# Patient Record
Sex: FEMALE | Race: BLACK OR AFRICAN AMERICAN | NOT HISPANIC OR LATINO | Employment: OTHER | URBAN - METROPOLITAN AREA
[De-identification: names, ages, dates, MRNs, and addresses within clinical notes are randomized per-mention and may not be internally consistent; named-entity substitution may affect disease eponyms.]

---

## 2017-11-22 PROBLEM — M65.341 TRIGGER RING FINGER OF RIGHT HAND: Status: ACTIVE | Noted: 2017-11-22

## 2017-11-22 PROBLEM — M65.342 TRIGGER RING FINGER OF LEFT HAND: Status: ACTIVE | Noted: 2017-11-22

## 2017-11-22 NOTE — H&P
[facility]  Orthopedics  H&P    Patient Name: Karin Mcdaniel  MRN: [id number]  Admission Date: (Not on file)  Primary Care Provider: Efren Andujar Jr, MD    Subjective:     Principal Problem:Trigger ring finger of left hand    Chief Complaint: No chief complaint on file.     HPI: Karin Mcdaniel is a 57 y.o. y/o female with a history of left ring finger trigger finger. She understands the risks and benefits of surgery and wishes to proceed with left ring finger trigger release at this time.      Past Medical History:   Diagnosis Date    Heart attack     Hypertension     Stroke        Past Surgical History:   Procedure Laterality Date    HYSTERECTOMY         Review of patient's allergies indicates:   Allergen Reactions    Pcn [penicillins]        No current facility-administered medications for this encounter.      Current Outpatient Prescriptions   Medication Sig    AMLODIPINE BESYLATE, BULK, MISC Take 10 mg by mouth once daily.    carvedilol (COREG) 12.5 MG tablet Take 25 mg by mouth 2 (two) times daily with meals.     clopidogrel (PLAVIX) 75 mg tablet Take 75 mg by mouth once daily.    furosemide (LASIX) 20 MG tablet Take 20 mg by mouth 2 (two) times daily.    lisinopril (PRINIVIL,ZESTRIL) 2.5 MG tablet Take 20 mg by mouth once daily.     loratadine (CLARITIN) 10 mg tablet Take 10 mg by mouth once daily.    potassium chloride (MICRO-K) 10 MEQ CpSR Take 20 mEq by mouth once daily.    quetiapine (SEROQUEL) 200 MG Tab Take 400 mg by mouth nightly.     simvastatin (ZOCOR) 40 MG tablet Take 40 mg by mouth every evening.     Family History     None        Social History Main Topics    Smoking status: Current Every Day Smoker     Packs/day: 1.00    Smokeless tobacco: Not on file    Alcohol use No      Comment: Ocassionally    Drug use: No    Sexual activity: No     Review of Systems   Constitution: Negative for fever and night sweats.   HENT: Negative for hearing loss and sore  throat.    Eyes: Negative for blurred vision, discharge and double vision.   Cardiovascular: Negative for chest pain, dyspnea on exertion and syncope.   Respiratory: Negative for cough and shortness of breath.    Skin: Negative for rash and skin cancer.   Musculoskeletal: Positive for joint pain. Negative for back pain and neck pain.   Gastrointestinal: Negative for abdominal pain, nausea and vomiting.   Genitourinary: Negative for bladder incontinence and hematuria.   Neurological: Negative for loss of balance and sensory change.   Psychiatric/Behavioral: Negative for depression. The patient is not nervous/anxious.      Objective:     Vital Signs (Most Recent):    Vital Signs (24h Range):  BP: ()/()   Arterial Line BP: ()/()            There is no height or weight on file to calculate BMI.    No intake or output data in the 24 hours ending 11/22/17 0903    General    Constitutional: She is oriented to person, place, and time. She appears well-developed and well-nourished. No distress.   HENT:   Head: Normocephalic and atraumatic.   Eyes: Conjunctivae and EOM are normal. Pupils are equal, round, and reactive to light.   Neck: Normal range of motion. Neck supple.   Cardiovascular: Normal rate, regular rhythm and normal heart sounds.  Exam reveals no gallop and no friction rub.    No murmur heard.  Pulmonary/Chest: Effort normal and breath sounds normal. No respiratory distress.   Abdominal: Soft. Bowel sounds are normal. She exhibits no distension. There is no tenderness.   Neurological: She is alert and oriented to person, place, and time. She has normal reflexes.   Psychiatric: She has a normal mood and affect. Her behavior is normal. Judgment and thought content normal.               Assessment/Plan:     Active Diagnoses:    Diagnosis Date Noted POA    PRINCIPAL PROBLEM:  Trigger ring finger of left hand [M65.342] 11/22/2017 Yes      Problems Resolved During this Admission:    Diagnosis Date Noted Date Resolved  POA     Plan/Surgical Procedure: Left ring finger trigger release  Surgery Date / Location: 12/1/17 -Dr. Jones at [facility]  Pre-op clearance per Anesthesia  Pt will d/c NSAIDs  7 days prior to procedure.  Informed written consent has not been signed, patient wishes to proceed at this time.      Osvaldo Kaba PA-C  Orthopedics  [facility]

## 2017-11-30 ENCOUNTER — HOSPITAL ENCOUNTER (OUTPATIENT)
Dept: PREADMISSION TESTING | Facility: OTHER | Age: 57
Discharge: HOME | End: 2017-11-30
Attending: ORTHOPAEDIC SURGERY
Payer: MEDICARE

## 2017-11-30 ENCOUNTER — ANESTHESIA EVENT (OUTPATIENT)
Dept: SURGERY | Facility: OTHER | Age: 57
End: 2017-11-30
Payer: MEDICARE

## 2017-11-30 VITALS
TEMPERATURE: 98 F | BODY MASS INDEX: 44.76 KG/M2 | OXYGEN SATURATION: 97 % | HEIGHT: 60 IN | WEIGHT: 228 LBS | DIASTOLIC BLOOD PRESSURE: 76 MMHG | SYSTOLIC BLOOD PRESSURE: 135 MMHG | HEART RATE: 99 BPM

## 2017-11-30 RX ORDER — TIZANIDINE 4 MG/1
4 TABLET ORAL EVERY 8 HOURS
COMMUNITY

## 2017-11-30 RX ORDER — DICLOFENAC POTASSIUM 50 MG/1
50 TABLET, FILM COATED ORAL 3 TIMES DAILY
COMMUNITY

## 2017-11-30 RX ORDER — FAMOTIDINE 20 MG/1
20 TABLET, FILM COATED ORAL
Status: CANCELLED | OUTPATIENT
Start: 2017-11-30 | End: 2017-11-30

## 2017-11-30 RX ORDER — LIDOCAINE HYDROCHLORIDE 10 MG/ML
1 INJECTION, SOLUTION EPIDURAL; INFILTRATION; INTRACAUDAL; PERINEURAL ONCE
Status: CANCELLED | OUTPATIENT
Start: 2017-11-30 | End: 2017-11-30

## 2017-11-30 RX ORDER — SODIUM CHLORIDE, SODIUM LACTATE, POTASSIUM CHLORIDE, CALCIUM CHLORIDE 600; 310; 30; 20 MG/100ML; MG/100ML; MG/100ML; MG/100ML
INJECTION, SOLUTION INTRAVENOUS CONTINUOUS
Status: CANCELLED | OUTPATIENT
Start: 2017-11-30

## 2017-11-30 RX ORDER — OXYCODONE HYDROCHLORIDE 5 MG/1
5 TABLET ORAL ONCE AS NEEDED
Status: CANCELLED | OUTPATIENT
Start: 2017-11-30 | End: 2017-11-30

## 2017-11-30 RX ORDER — BUDESONIDE AND FORMOTEROL FUMARATE DIHYDRATE 160; 4.5 UG/1; UG/1
2 AEROSOL RESPIRATORY (INHALATION) EVERY 12 HOURS
COMMUNITY

## 2017-11-30 RX ORDER — ALBUTEROL SULFATE 0.83 MG/ML
2.5 SOLUTION RESPIRATORY (INHALATION)
Status: CANCELLED | OUTPATIENT
Start: 2017-11-30 | End: 2017-11-30

## 2017-11-30 NOTE — ANESTHESIA PREPROCEDURE EVALUATION
11/30/2017  Karin Mcdaniel is a 57 y.o., female.    Anesthesia Evaluation    I have reviewed the Patient Summary Reports.    I have reviewed the Nursing Notes.   I have reviewed the Medications.     Review of Systems  Anesthesia Hx:  No problems with previous Anesthesia    Social:  Former Smoker, Non-Smoker    Hematology/Oncology:  Hematology Normal   Oncology Normal     EENT/Dental:EENT/Dental Normal   Cardiovascular:   Exercise tolerance: poor Hypertension, well controlled Past MI CAD asymptomatic     Pulmonary:  Pulmonary Normal    Renal/:  Renal/ Normal     Hepatic/GI:  Hepatic/GI Normal    Neurological:   CVA, residual symptoms Weak legs.   Endocrine:  Endocrine Normal    Dermatological:  Skin Normal    Psych:  Psychiatric Normal           Physical Exam  General:  Morbid Obesity    Airway/Jaw/Neck:  Airway Findings: Mouth Opening: Normal Tongue: Large  General Airway Assessment: Adult, Average  Mallampati: II  TM Distance: Normal, at least 6 cm  Jaw/Neck Findings:  Neck ROM: Normal ROM      Dental:  Dental Findings:        Mental Status:  Mental Status Findings:  Cooperative, Alert and Oriented         Anesthesia Plan  Type of Anesthesia, risks & benefits discussed:  Anesthesia Type:  general  Patient's Preference:   Intra-op Monitoring Plan: standard ASA monitors  Intra-op Monitoring Plan Comments:   Post Op Pain Control Plan:   Post Op Pain Control Plan Comments:   Induction:    Beta Blocker:         Informed Consent: Patient understands risks and agrees with Anesthesia plan.  Questions answered. Anesthesia consent signed with patient.  ASA Score: 3     Day of Surgery Review of History & Physical:    H&P update referred to the surgeon.     Anesthesia Plan Notes: No labs. Propofol for injection.        Ready For Surgery From Anesthesia Perspective.

## 2017-11-30 NOTE — DISCHARGE INSTRUCTIONS
PRE-ADMIT TESTING -  [phone]    [address]  [place] [facility]          Your surgery has been scheduled at [facility]. We are pleased to have the opportunity to serve you. For Further Information please call [phone].    On the day of surgery please report to the Information Desk on the 1st floor.    · CONTACT YOUR PHYSICIAN'S OFFICE THE DAY PRIOR TO YOUR SURGERY TO OBTAIN YOUR ARRIVAL TIME.     · The evening before surgery do not eat anything after 9 p.m. ( this includes hard candy, chewing gum and mints).  You may only have GATORADE, POWERADE AND WATER  from 9 p.m. until you leave your home.   DO NOT DRINK ANY LIQUIDS ON THE WAY TO THE HOSPITAL.      SPECIAL MEDICATION INSTRUCTIONS: TAKE medications checked off by the Anesthesiologist on your Medication List.    Angiogram Patients: Take medications as instructed by your physician, including aspirin.     Surgery Patients:    If you take ASPIRIN - Your PHYSICIAN/SURGEON will need to inform you IF/OR when you need to stop taking aspirin prior to your surgery.     Do Not take any medications containing IBUPROFEN.  Do Not Wear any make-up or dark nail polish   (especially eye make-up) to surgery. If you come to surgery with makeup on you will be required to remove the makeup or nail polish.    Do not shave your surgical area at least 5 days prior to your surgery. The surgical prep will be performed at the hospital according to Infection Control regulations.    Leave all valuables at home.   Do Not wear any jewelry or watches, including any metal in body piercings.  Contact Lens must be removed before surgery. Either do not wear the contact lens or bring a case and solution for storage.  Please bring a container for eyeglasses or dentures as required.  Bring any paperwork your physician has provided, such as consent forms,  history and physicals, doctor's orders, etc.   Bring comfortable clothes that are loose fitting to wear upon  discharge. Take into consideration the type of surgery being performed.  Maintain your diet as advised per your physician the day prior to surgery.      Adequate rest the night before surgery is advised.   Park in the Parking lot behind the hospital or in the [facility] across the street from the parking lot. Parking is complimentary.  If you will be discharged the same day as your procedure, please arrange for a responsible adult to drive you home or to accompany you if traveling by taxi.   YOU WILL NOT BE PERMITTED TO DRIVE OR TO LEAVE THE HOSPITAL ALONE AFTER SURGERY.   It is strongly recommended that you arrange for someone to remain with you for the first 24 hrs following your surgery.       Thank you for your cooperation.  The Staff of [facility].        Bathing Instructions                                                                 Please shower the evening before and morning of your procedure with    ANTIBACTERIAL SOAP. ( DIAL, etc )  Concentrate on the surgical area   for at least 3 minutes and rinse completely. Dry off as usual.   Do not use any deodorant, powder, body lotions, perfume, after shave or    cologne.

## 2017-11-30 NOTE — PRE ADMISSION SCREENING
Received via fax Lexiscan Nuclear stress study from 3/13/17, Dr. Rojas.  Reviewed by , anesthesia.  No new orders

## 2017-12-01 ENCOUNTER — ANESTHESIA (OUTPATIENT)
Dept: SURGERY | Facility: OTHER | Age: 57
End: 2017-12-01
Payer: MEDICARE

## 2017-12-01 ENCOUNTER — HOSPITAL ENCOUNTER (OUTPATIENT)
Facility: OTHER | Age: 57
Discharge: HOME | End: 2017-12-01
Attending: ORTHOPAEDIC SURGERY | Admitting: ORTHOPAEDIC SURGERY
Payer: MEDICARE

## 2017-12-01 ENCOUNTER — SURGERY (OUTPATIENT)
Age: 57
End: 2017-12-01

## 2017-12-01 VITALS
DIASTOLIC BLOOD PRESSURE: 82 MMHG | RESPIRATION RATE: 18 BRPM | WEIGHT: 228 LBS | OXYGEN SATURATION: 96 % | HEART RATE: 80 BPM | TEMPERATURE: 98 F | HEIGHT: 60 IN | BODY MASS INDEX: 44.76 KG/M2 | SYSTOLIC BLOOD PRESSURE: 132 MMHG

## 2017-12-01 DIAGNOSIS — M65.342 TRIGGER FINGER, LEFT RING FINGER: ICD-10-CM

## 2017-12-01 LAB — POCT GLUCOSE: 104 MG/DL (ref 70–110)

## 2017-12-01 PROCEDURE — 25000242 PHARM REV CODE 250 ALT 637 W/ HCPCS: Performed by: SPECIALIST

## 2017-12-01 PROCEDURE — 63600175 PHARM REV CODE 636 W HCPCS: Performed by: NURSE ANESTHETIST, CERTIFIED REGISTERED

## 2017-12-01 PROCEDURE — 37000008 HC ANESTHESIA 1ST 15 MINUTES: Performed by: ORTHOPAEDIC SURGERY

## 2017-12-01 PROCEDURE — 25000003 PHARM REV CODE 250: Performed by: ORTHOPAEDIC SURGERY

## 2017-12-01 PROCEDURE — 25000003 PHARM REV CODE 250: Performed by: PHYSICIAN ASSISTANT

## 2017-12-01 PROCEDURE — 94761 N-INVAS EAR/PLS OXIMETRY MLT: CPT

## 2017-12-01 PROCEDURE — S0077 INJECTION, CLINDAMYCIN PHOSP: HCPCS | Performed by: PHYSICIAN ASSISTANT

## 2017-12-01 PROCEDURE — 25000003 PHARM REV CODE 250: Performed by: SPECIALIST

## 2017-12-01 PROCEDURE — 63600175 PHARM REV CODE 636 W HCPCS: Performed by: ORTHOPAEDIC SURGERY

## 2017-12-01 PROCEDURE — 36000706: Performed by: ORTHOPAEDIC SURGERY

## 2017-12-01 PROCEDURE — 36000707: Performed by: ORTHOPAEDIC SURGERY

## 2017-12-01 PROCEDURE — 82962 GLUCOSE BLOOD TEST: CPT | Performed by: ORTHOPAEDIC SURGERY

## 2017-12-01 PROCEDURE — 71000016 HC POSTOP RECOV ADDL HR: Performed by: ORTHOPAEDIC SURGERY

## 2017-12-01 PROCEDURE — 94640 AIRWAY INHALATION TREATMENT: CPT

## 2017-12-01 PROCEDURE — 71000015 HC POSTOP RECOV 1ST HR: Performed by: ORTHOPAEDIC SURGERY

## 2017-12-01 PROCEDURE — 37000009 HC ANESTHESIA EA ADD 15 MINS: Performed by: ORTHOPAEDIC SURGERY

## 2017-12-01 PROCEDURE — 25000003 PHARM REV CODE 250: Performed by: NURSE ANESTHETIST, CERTIFIED REGISTERED

## 2017-12-01 RX ORDER — CLINDAMYCIN PHOSPHATE 900 MG/50ML
900 INJECTION, SOLUTION INTRAVENOUS
Status: COMPLETED | OUTPATIENT
Start: 2017-12-01 | End: 2017-12-01

## 2017-12-01 RX ORDER — ONDANSETRON 2 MG/ML
4 INJECTION INTRAMUSCULAR; INTRAVENOUS DAILY PRN
Status: DISCONTINUED | OUTPATIENT
Start: 2017-12-01 | End: 2017-12-01 | Stop reason: HOSPADM

## 2017-12-01 RX ORDER — OXYCODONE HYDROCHLORIDE 5 MG/1
5 TABLET ORAL ONCE AS NEEDED
Status: DISCONTINUED | OUTPATIENT
Start: 2017-12-01 | End: 2017-12-01 | Stop reason: SDUPTHER

## 2017-12-01 RX ORDER — LIDOCAINE HYDROCHLORIDE 10 MG/ML
1 INJECTION, SOLUTION EPIDURAL; INFILTRATION; INTRACAUDAL; PERINEURAL ONCE
Status: DISCONTINUED | OUTPATIENT
Start: 2017-12-01 | End: 2017-12-01 | Stop reason: HOSPADM

## 2017-12-01 RX ORDER — METHYLPREDNISOLONE ACETATE 40 MG/ML
INJECTION, SUSPENSION INTRA-ARTICULAR; INTRALESIONAL; INTRAMUSCULAR; SOFT TISSUE
Status: DISCONTINUED | OUTPATIENT
Start: 2017-12-01 | End: 2017-12-01 | Stop reason: HOSPADM

## 2017-12-01 RX ORDER — FAMOTIDINE 20 MG/1
20 TABLET, FILM COATED ORAL
Status: COMPLETED | OUTPATIENT
Start: 2017-12-01 | End: 2017-12-01

## 2017-12-01 RX ORDER — SODIUM CHLORIDE 0.9 % (FLUSH) 0.9 %
5 SYRINGE (ML) INJECTION
Status: DISCONTINUED | OUTPATIENT
Start: 2017-12-01 | End: 2017-12-01 | Stop reason: HOSPADM

## 2017-12-01 RX ORDER — SODIUM CHLORIDE 0.9 % (FLUSH) 0.9 %
3 SYRINGE (ML) INJECTION
Status: DISCONTINUED | OUTPATIENT
Start: 2017-12-01 | End: 2017-12-01 | Stop reason: HOSPADM

## 2017-12-01 RX ORDER — PROPOFOL 10 MG/ML
VIAL (ML) INTRAVENOUS
Status: DISCONTINUED | OUTPATIENT
Start: 2017-12-01 | End: 2017-12-01

## 2017-12-01 RX ORDER — SODIUM CHLORIDE, SODIUM LACTATE, POTASSIUM CHLORIDE, CALCIUM CHLORIDE 600; 310; 30; 20 MG/100ML; MG/100ML; MG/100ML; MG/100ML
INJECTION, SOLUTION INTRAVENOUS CONTINUOUS
Status: DISCONTINUED | OUTPATIENT
Start: 2017-12-01 | End: 2017-12-01 | Stop reason: HOSPADM

## 2017-12-01 RX ORDER — DIPHENHYDRAMINE HYDROCHLORIDE 50 MG/ML
25 INJECTION INTRAMUSCULAR; INTRAVENOUS EVERY 6 HOURS PRN
Status: DISCONTINUED | OUTPATIENT
Start: 2017-12-01 | End: 2017-12-01 | Stop reason: HOSPADM

## 2017-12-01 RX ORDER — FENTANYL CITRATE 50 UG/ML
25 INJECTION, SOLUTION INTRAMUSCULAR; INTRAVENOUS EVERY 5 MIN PRN
Status: DISCONTINUED | OUTPATIENT
Start: 2017-12-01 | End: 2017-12-01 | Stop reason: HOSPADM

## 2017-12-01 RX ORDER — HYDROMORPHONE HYDROCHLORIDE 2 MG/ML
0.4 INJECTION, SOLUTION INTRAMUSCULAR; INTRAVENOUS; SUBCUTANEOUS EVERY 5 MIN PRN
Status: DISCONTINUED | OUTPATIENT
Start: 2017-12-01 | End: 2017-12-01 | Stop reason: HOSPADM

## 2017-12-01 RX ORDER — HYDROCODONE BITARTRATE AND ACETAMINOPHEN 5; 325 MG/1; MG/1
1 TABLET ORAL
Qty: 45 TABLET | Refills: 0 | Status: SHIPPED | OUTPATIENT
Start: 2017-12-01

## 2017-12-01 RX ORDER — ONDANSETRON 2 MG/ML
4 INJECTION INTRAMUSCULAR; INTRAVENOUS EVERY 12 HOURS PRN
Status: DISCONTINUED | OUTPATIENT
Start: 2017-12-01 | End: 2017-12-01 | Stop reason: HOSPADM

## 2017-12-01 RX ORDER — MIDAZOLAM HYDROCHLORIDE 1 MG/ML
INJECTION INTRAMUSCULAR; INTRAVENOUS
Status: DISCONTINUED | OUTPATIENT
Start: 2017-12-01 | End: 2017-12-01

## 2017-12-01 RX ORDER — OXYCODONE HYDROCHLORIDE 5 MG/1
5 TABLET ORAL
Status: DISCONTINUED | OUTPATIENT
Start: 2017-12-01 | End: 2017-12-01 | Stop reason: HOSPADM

## 2017-12-01 RX ORDER — LIDOCAINE HYDROCHLORIDE 10 MG/ML
INJECTION, SOLUTION EPIDURAL; INFILTRATION; INTRACAUDAL; PERINEURAL
Status: DISCONTINUED | OUTPATIENT
Start: 2017-12-01 | End: 2017-12-01 | Stop reason: HOSPADM

## 2017-12-01 RX ORDER — SODIUM CHLORIDE 9 MG/ML
INJECTION, SOLUTION INTRAVENOUS CONTINUOUS
Status: DISCONTINUED | OUTPATIENT
Start: 2017-12-01 | End: 2017-12-01 | Stop reason: HOSPADM

## 2017-12-01 RX ORDER — FLUMAZENIL 0.1 MG/ML
INJECTION INTRAVENOUS
Status: DISCONTINUED | OUTPATIENT
Start: 2017-12-01 | End: 2017-12-01

## 2017-12-01 RX ORDER — ALBUTEROL SULFATE 0.83 MG/ML
2.5 SOLUTION RESPIRATORY (INHALATION)
Status: COMPLETED | OUTPATIENT
Start: 2017-12-01 | End: 2017-12-01

## 2017-12-01 RX ORDER — MEPERIDINE HYDROCHLORIDE 50 MG/ML
12.5 INJECTION INTRAMUSCULAR; INTRAVENOUS; SUBCUTANEOUS ONCE AS NEEDED
Status: DISCONTINUED | OUTPATIENT
Start: 2017-12-01 | End: 2017-12-01 | Stop reason: HOSPADM

## 2017-12-01 RX ADMIN — PROPOFOL 10 MG: 10 INJECTION, EMULSION INTRAVENOUS at 10:12

## 2017-12-01 RX ADMIN — FLUMAZENIL 0.5 MG: 0.1 INJECTION, SOLUTION INTRAVENOUS at 10:12

## 2017-12-01 RX ADMIN — SODIUM CHLORIDE, SODIUM LACTATE, POTASSIUM CHLORIDE, AND CALCIUM CHLORIDE: 600; 310; 30; 20 INJECTION, SOLUTION INTRAVENOUS at 09:12

## 2017-12-01 RX ADMIN — FAMOTIDINE 20 MG: 20 TABLET, FILM COATED ORAL at 08:12

## 2017-12-01 RX ADMIN — LIDOCAINE HYDROCHLORIDE 10 ML: 10 INJECTION, SOLUTION EPIDURAL; INFILTRATION; INTRACAUDAL; PERINEURAL at 10:12

## 2017-12-01 RX ADMIN — OXYCODONE HYDROCHLORIDE 5 MG: 5 TABLET ORAL at 10:12

## 2017-12-01 RX ADMIN — MIDAZOLAM HYDROCHLORIDE 2 MG: 1 INJECTION, SOLUTION INTRAMUSCULAR; INTRAVENOUS at 09:12

## 2017-12-01 RX ADMIN — CLINDAMYCIN PHOSPHATE 900 MG: 18 INJECTION, SOLUTION INTRAVENOUS at 09:12

## 2017-12-01 RX ADMIN — PROPOFOL 30 MG: 10 INJECTION, EMULSION INTRAVENOUS at 09:12

## 2017-12-01 RX ADMIN — METHYLPREDNISOLONE ACETATE 40 MG: 40 INJECTION, SUSPENSION INTRA-ARTICULAR; INTRALESIONAL; INTRAMUSCULAR; SOFT TISSUE at 10:12

## 2017-12-01 RX ADMIN — ALBUTEROL SULFATE 2.5 MG: 2.5 SOLUTION RESPIRATORY (INHALATION) at 08:12

## 2017-12-01 NOTE — ANESTHESIA POSTPROCEDURE EVALUATION
Anesthesia Post Evaluation    Patient: Karin Mcdaniel    Procedure(s) Performed: Procedure(s) (LRB):  RELEASE-FINGER-TRIGGER- ring finger (Left)    Final Anesthesia Type: MAC  Patient location during evaluation: [facility]  Patient participation: Yes- Able to Participate  Level of consciousness: awake and alert, awake and oriented  Post-procedure vital signs: reviewed and stable  Pain management: adequate  Airway patency: patent  PONV status at discharge: No PONV  Anesthetic complications: no      Cardiovascular status: blood pressure returned to baseline  Respiratory status: unassisted and spontaneous ventilation  Hydration status: euvolemic  Follow-up not needed.        Visit Vitals  BP (!) 147/82   Pulse 99   Resp (!) 22   Ht 5' (1.524 m)   Wt 103.4 kg (228 lb)   SpO2 99%   Breastfeeding? No   BMI 44.53 kg/m²       Pain/Oniel Score: Pain Assessment Performed: Yes (12/1/2017  8:30 AM)  Presence of Pain: complains of pain/discomfort (12/1/2017  8:30 AM)

## 2017-12-01 NOTE — OP NOTE
Ortho Op Note    preop dx:  Left ring finger trigger finger  Postop dx:  Same  Procedure:  Left ring finger trigger release  Efra GRAVES  EBL: minimal  Complications: none  No specimen  To pacu: stable

## 2017-12-01 NOTE — PLAN OF CARE
Karin Mcdaniel has met all discharge criteria from Phase II. Vital Signs are stable, ambulating  without difficulty.Pain is now under control and tolerable for the pt. Pain score 4 at this time.  Discharge instructions given, patient verbalized understanding. Discharged from facility via wheelchair in stable condition.

## 2017-12-01 NOTE — OP NOTE
DATE OF PROCEDURE:  12/01/2017    PREOPERATIVE DIAGNOSIS:  Left ring finger trigger finger.    POSTOPERATIVE DIAGNOSIS:  Left ring finger trigger finger.    PROCEDURE:  Left ring finger trigger release.    PRIMARY SURGEON:  Erik Jones M.D.    ASSISTANT:  Osvaldo Kaba.    ANESTHESIA:  MAC with local.    ESTIMATED BLOOD LOSS:  Minimal.    COMPLICATIONS:  None.    HISTORY:  Ms. Mcdaniel is a 57-year-old female with history of left ring finger   triggering.  She has failed conservative treatment, desires surgical   intervention.  Risks, options and benefits of surgery were explained to the   patient.  She stated understanding and wished to proceed.  Risks include   bleeding, infection, blood clot, injury to nerve or blood vessel, continued   pain, stiffness, recurrence.    OPERATIVE COURSE:  The patient was identified in the preoperative holding area.   .  Left leg was marked as correct.  The patient was taken to the Operating Room   after adequate anesthesia, the left hand was anesthetized using local.  Left   hand was sterilely prepped and draped in usual sterile fashion.  Tourniquet   inflated to 200.  We made a small transverse incision just distal to the distal   palmar crease.  We protected neurovascular bundle medially and laterally and   released the A1 pulley in its entirety proximally and distally.  We had free   range of motion of the trigger.  We then irrigated the wound copiously.  We   injected Depo-Medrol deep and closed the wound with nylon suture, placed a   sterile bandage.  The patient was awakened, taken to Recovery in stable   condition.  The instrument, needle and sponge counts were correct.  No   complications.  RUDY Jeter participated in important parts of this case   including positioning the patient, assisting with retraction, assisting with   release wound closure and bandage placement.      RAFIA/IN  dd: 12/01/2017 10:18:55 (CST)  td: 12/01/2017 13:20:27 (CST)  Doc ID   #[id number]  Job ID  #[id number]    CC:

## 2017-12-01 NOTE — DISCHARGE INSTRUCTIONS
Erik Jones M.D.  Giancarlo Beltran M.D.  Roshan Rincon M.D.          [address] [place] [address] [address]  [place] [address]  Phone: [phone]  Fax: [phone]         DISCHARGE INSTRUCTIONS for Hand / Wrist / Elbow Surgery             Call our office ([phone]) immediately if you experience any of the following:      Excessive bleeding or pus like drainage at the incision site       Uncontrollable pain not relieved by pain medication       Excessive swelling or redness at the incision site       Fever above 101.5 degrees not controlled with Tylenol or Motrin       Shortness of Breath       Any foul odor or blistering from the surgery site     FOR EMERGENCIES: If any unusual problems or difficulties occur, call our office at [phone], or [phone] (After hours) or contact 911 at any time for emergencies    1.   Diet: Eat a liquid / bland diet for the first day after surgery. Progress your to your regular diet as tolerated. Constipation may occur with Narcotic usage, contact our office if you are experiencing constipation.    2.   Pain Management: Ice, pain medications and anesthesia injections are used to manage your post-operative pain.    *Medications: You were given one or more narcotic pain medications before leaving the hospital. Have the prescriptions filled at a pharmacy on your way home and follow the instructions on the bottles.    *Narcotic Medication (usually Norco - hydrocodone or Percocet - oxycodone): Take this medication if needed to relieve severe pain. Take 1 pill every 4 hours. If your pain is not relieved you make take a second pill at your next dose. Always take with food.     *Take note: if you find you are taking more than 2 pills every 4 hours, contact the office as        the amount of acetaminophen may exceed appropriate levels.    *Nausea / Vomiting: For this issue, contact our office for a separate prescription that may be called in to your pharmacy.    *Cold  Therapy: You may begin using ice OR the ice machine given to you with the wrap attachment to the affected area over your dressing throughout the day for the first 3-5 days and then as needed to help relieve pain and control swelling. Use it as often as 20 minutes ONCE every hour. You can continue this for several weeks following surgery if needed.    *Regional Anesthesia Injections (Blocks): You may have been given a regional nerve block either before or after surgery. This may make your entire arm numb for 24-36 hours.          * Proceed with caution when trying to use your arm     3.   Return visit: Please schedule your return visit to Dr. Jones's office 14 days after your procedure for suture / staples removal and wound check.    4.   Activity: Limit your activity during the first 48 hours, keep your arm elevated with pillows. After the first 48 hours at home, increase your activity level based on your symptoms.    5.   Dressing Change (ONLY if no Splint/Cast present): Wounds are usually closed with either steri-strips, sutures or staples. It is normal for some blood / drainage to be seen on the dressings. It is also normal for you to see apparent bruising on the skin around your incision. If present, leave the steri-strip tape across the incisions. If you are concerned by the drainage or the appearance of your knee, please call one of the numbers listed above. You can remove the dressing (not the steri-strips) on the 3rd day after surgery. For larger incisions, you will need to keep it away from water until the stitches or staples are removed. You can use an ace bandage for support and compression if desired.     6.   Splint / Cast: You may have been sent home in a post-operative splint or cast. Do NOT get the cast wet. If the cast does become wet, contact our office immediately to avoid any complications.    7.   Showering (ONLY if no Splint/Cast present): You may shower on the 3rd day after surgery if the  wound is dry and clean, but do not let the wound soak in water until sutures are removed. Do not submerge in any water until after your 2 week postoperative appointment in clinic.     *SPLINTS/CASTS MUST REMAIN CLEAN AND DRY    10.  Home Exercises: Begin ONLY the following MARKED exercises the first day after surgery in order to help you regain your motion and strength. You may do the following marked exercises for 2-5 mins five times a day:     []  Wrist motion -        [] Flexion   [] Extension   [] Pronation   [] Supination   [] Radial deviation  [] Ulnar Deviation                                                                                                                       [] Elbow motion - Straighten and bend your elbow.                                                                                                                   [] Ball squeezes - use ball attached to sling/pillow or soft (nerf) ball for  strengthening                                                                                                                  11.  Physical Therapy: Rehabilitation is an essential component to your recovery from surgery. You will not need formal physical therapy. If you require formal physical therapy, you are encouraged to find a Physical Therapy center that is both near your residence and comfortable to you. Please notify us if you have a preference of a Physical Therapy clinic. Please contact the office at the numbers above if you have any questions or if there is any delay in the start of Physical Therapy.       Anesthesia: Monitored Anesthesia Care (MAC)    Anesthesia Safety  · Have an adult family member or friend drive you home after the procedure.  · For the first 24 hours after your surgery:  ¨ Do not drive or use heavy equipment.  ¨ Do not make important decisions or sign documents.  ¨ Avoid alcohol.  ¨ Have someone stay with you, if possible. They can watch for problems and help keep  you safe.    PLEASE FOLLOW ANY OTHER INSTRUCTIONS PROVIDED TO YOU BY DR. KYLE!

## 2017-12-01 NOTE — INTERVAL H&P NOTE
The patient has been examined and the H&P has been reviewed:    I concur with the findings and no changes have occurred since H&P was written.    Anesthesia/Surgery risks, benefits and alternative options discussed and understood by patient/family.          Active Hospital Problems    Diagnosis  POA    *Trigger ring finger of left hand [M65.342]  Yes      Resolved Hospital Problems    Diagnosis Date Resolved POA   No resolved problems to display.

## 2017-12-01 NOTE — BRIEF OP NOTE
[facility]  Brief Operative Note  Orthopaedic Surgery     SUMMARY     Surgery Date: 12/1/2017     Surgeon(s) and Role:     * Erik Jones MD - Primary    Assisting Surgeon: None    Assistants: Osvaldo Kaba PA-C    Pre-op Diagnosis:  Trigger finger, left ring finger [M65.342]    Post-op Diagnosis:  Post-Op Diagnosis Codes:     * Trigger finger, left ring finger [M65.342]    Procedure(s) (LRB):  RELEASE-FINGER-TRIGGER- ring finger (Left)    Anesthesia: Monitor Anesthesia Care    Description of the findings of the procedure: See dictated operative report for details    Estimated Blood Loss: less than 10         Specimens:   Specimen (12h ago through future)    None          Discharge Note    SUMMARY     Admit Date: 12/1/2017    Discharge Date and Time:  12/01/2017 10:22 AM    Hospital Course (synopsis of major diagnoses, care, treatment, and services provided during the course of the hospital stay): Patient underwent outpatient left hand surgery and was transferred to PACU in stable condition. In PACU, patient received appropriate post-operative care and discharged home with plans for physical therapy and follow-up with the operative surgeon.    Pre-op Diagnosis:  Trigger finger, left ring finger [M65.342]    Final Diagnosis:  Post-Op Diagnosis Codes:     * Trigger finger, left ring finger [M65.342]    Procedure(s) (LRB):  RELEASE-FINGER-TRIGGER- ring finger (Left)     Diet: Regular     Disposition: Home or Self Care    Follow Up/Patient Instructions:     Medications:  Reconciled Home Medications:   Current Discharge Medication List      START taking these medications    Details   hydrocodone-acetaminophen 5-325mg (NORCO) 5-325 mg per tablet Take 1 tablet by mouth every 4 to 6 hours as needed for Pain.  Qty: 45 tablet, Refills: 0         CONTINUE these medications which have NOT CHANGED    Details   budesonide-formoterol 160-4.5 mcg (SYMBICORT) 160-4.5 mcg/actuation HFAA Inhale 2 puffs  into the lungs every 12 (twelve) hours. Controller      carvedilol (COREG) 12.5 MG tablet Take 25 mg by mouth 2 (two) times daily with meals.       AMLODIPINE BESYLATE, BULK, MISC Take 10 mg by mouth once daily.      clopidogrel (PLAVIX) 75 mg tablet Take 75 mg by mouth once daily. Last dose 11/22/17 as directed by Dr. Jones office for surgery on 12/1/17      diclofenac (CATAFLAM) 50 MG tablet Take 50 mg by mouth 3 (three) times daily.      lisinopril (PRINIVIL,ZESTRIL) 2.5 MG tablet Take 40 mg by mouth once daily.       quetiapine (SEROQUEL) 200 MG Tab Take 800 mg by mouth nightly.       simvastatin (ZOCOR) 40 MG tablet Take 40 mg by mouth every evening.      tiZANidine (ZANAFLEX) 4 MG tablet Take 4 mg by mouth every 8 (eight) hours.             Discharge Procedure Orders  Diet general     Activity as tolerated     Ice to affected area     Lifting restrictions   Order Comments: Operative extremity     No driving, operating heavy equipment or signing legal documents while taking pain medication     Call MD for:  temperature >100.4     Call MD for:  persistent nausea and vomiting     Call MD for:  severe uncontrolled pain     Call MD for:  difficulty breathing, headache or visual disturbances     Call MD for:  redness, tenderness, or signs of infection (pain, swelling, redness, odor or green/yellow discharge around incision site)     Call MD for:  hives     Call MD for:  persistent dizziness or light-headedness     Call MD for:  extreme fatigue     Remove dressing in 72 hours   Order Comments: Patient will remove dressing on post-op day 3, replace with OTC waterproof bandaids, can shower day 3       Follow-up Information     Erik Jones MD In 2 weeks.    Specialty:  Orthopedic Surgery  Why:  For suture removal, For wound re-check  Contact information:  [address] [facility] [address] [phone]

## 2020-10-22 ENCOUNTER — OFFICE VISIT (OUTPATIENT)
Dept: SLEEP MEDICINE | Facility: CLINIC | Age: 60
End: 2020-10-22
Payer: MEDICARE

## 2020-10-22 VITALS
SYSTOLIC BLOOD PRESSURE: 103 MMHG | DIASTOLIC BLOOD PRESSURE: 64 MMHG | WEIGHT: 215.63 LBS | HEART RATE: 88 BPM | HEIGHT: 60 IN | BODY MASS INDEX: 42.33 KG/M2

## 2020-10-22 DIAGNOSIS — F51.09 OTHER INSOMNIA NOT DUE TO A SUBSTANCE OR KNOWN PHYSIOLOGICAL CONDITION: Primary | ICD-10-CM

## 2020-10-22 DIAGNOSIS — G47.10 HYPERSOMNOLENCE: ICD-10-CM

## 2020-10-22 PROCEDURE — 3008F PR BODY MASS INDEX (BMI) DOCUMENTED: ICD-10-PCS | Mod: CPTII,S$GLB,, | Performed by: INTERNAL MEDICINE

## 2020-10-22 PROCEDURE — 3008F BODY MASS INDEX DOCD: CPT | Mod: CPTII,S$GLB,, | Performed by: INTERNAL MEDICINE

## 2020-10-22 PROCEDURE — 99204 OFFICE O/P NEW MOD 45 MIN: CPT | Mod: S$GLB,,, | Performed by: INTERNAL MEDICINE

## 2020-10-22 PROCEDURE — 99204 PR OFFICE/OUTPT VISIT, NEW, LEVL IV, 45-59 MIN: ICD-10-PCS | Mod: S$GLB,,, | Performed by: INTERNAL MEDICINE

## 2020-10-22 PROCEDURE — 99999 PR PBB SHADOW E&M-EST. PATIENT-LVL III: ICD-10-PCS | Mod: PBBFAC,,, | Performed by: INTERNAL MEDICINE

## 2020-10-22 PROCEDURE — 99999 PR PBB SHADOW E&M-EST. PATIENT-LVL III: CPT | Mod: PBBFAC,,, | Performed by: INTERNAL MEDICINE

## 2020-10-22 PROCEDURE — 3074F SYST BP LT 130 MM HG: CPT | Mod: CPTII,S$GLB,, | Performed by: INTERNAL MEDICINE

## 2020-10-22 PROCEDURE — 3074F PR MOST RECENT SYSTOLIC BLOOD PRESSURE < 130 MM HG: ICD-10-PCS | Mod: CPTII,S$GLB,, | Performed by: INTERNAL MEDICINE

## 2020-10-22 PROCEDURE — 3078F DIAST BP <80 MM HG: CPT | Mod: CPTII,S$GLB,, | Performed by: INTERNAL MEDICINE

## 2020-10-22 PROCEDURE — 3078F PR MOST RECENT DIASTOLIC BLOOD PRESSURE < 80 MM HG: ICD-10-PCS | Mod: CPTII,S$GLB,, | Performed by: INTERNAL MEDICINE

## 2020-10-22 NOTE — PROGRESS NOTES
NEW PATIENT VISIT    Karin Mcdaniel  is a pleasant 60 y.o. female with hypertension, coronary artery disease, history of CVA. Referred by self.  The patient is here today with her daughter    The patient is here today complaining of difficulting falling and staying asleep despite medications for many years.     The patient had sleep testing which is not avialble.  She believes that it showed nothing wrong with her sleep..   The patient ENDORSES  snoring, nocturia, unrefreshing sleep. . The patient DENIES morning headache,  Sleep has been observed by the patient's spouse has noted loud snoring but not apneas      The patient has severe sleepiness during the day.  Does not drive due to  her medical conditions.  Denies hypnogogic hallucinations  Denies sleep paralysis.    The patient reports trouble falling and staying asleep.  She has taken seroquel 400mg to help her fall asleep, but it stopped working.  She has also tried melatonin. Xanax didn't help.   She does not feel sleepy when she goes to bed.     SLEEP SCHEDULE:  Bed Time:  10P  Environment:  Lights out:  Hypnotic:  Alcohol:  Sleep Latency: 1hr +  Arousals:  3+  Back to sleep: 1hr +  Wake time:  9A  Refreshed?  Weekend changes:   Naps:   x2  Nocturia:  x2  Work schedule:     ESS:   16/24    ROS:  The patient notes the following symptoms:  morning dry mouth, irregular heartbeat, shortness of breath, acid reflux, mood changes, urge to move legs a lot  The remainder of a complete ROS was performed and is negative except as noted above.  The patient's allergies and medications were reviewed.  The patient's past medical, family, and social history were reviewed.    Vitals:    10/22/20 1451   BP: 103/64   Pulse: 88   Weight: 97.8 kg (215 lb 9.8 oz)   Height: 5' (1.524 m)     Physical Exam:  GEN:   Well-appearing, vitals reviewed  SKIN:  No rash on the face or bridge of the nose  EYES:  No icterus, pupils equal  HEENT: Mallampati 4  no significant  retrognathia  CV:  Regular rate and rhythm    trace lower extremity edema  RESP:  Normal effort    clear to auscultation bilaterally  MSK:  Normal gait and station  Psych:  Appropriate affect, demonstrates insight  Neuro:  no resting tremor  RECORDS REVIEWED:    Prior sleep studies are unavailable.    ASSESSMENT:    SUSPECTED COURTNEY: Comorbidities: HTN, CAD .  STOP BANG positive predictors:  Loud snoring, Tired during the day, HTN, BMI > 35, Age > 50, .  TOTAL = 5/8.     HYPERSOMNOLENCE: At this point, suspect primarily due to underlying COURTNEY.     CHRONIC SLEEP ONSET AND MAINTENANCE INSOMNIA:  underlying sleep apnea likely contributing    PLAN:    -will start with home sleep testing (HST) given the patient's high pretest probability of COURTNEY. If home sleep testing is inconclusive, will need an in-lab diagnostic study to definitively exclude COURTNEY.   -we discussed trial of PAP therapy if COURTNEY is present  -will reevaluate daytime sleepiness after underlying COURTNEY is adequately treated.  -the patient was warned to never drive when sleepy    -will arrange RTC depending on results of sleep testing.    The patient was counseled on the pathophysiology of obstructive sleep apnea, cardiovascular risks of untreated moderate to severe COURTNEY and mask and machine desensitization    The patient was given open opportunity to ask questions and/or express concerns about treatment plan.   All questions/concerns were discussed.     Two patient identifiers used prior to evaluation.

## 2020-11-03 ENCOUNTER — TELEPHONE (OUTPATIENT)
Dept: SLEEP MEDICINE | Facility: OTHER | Age: 60
End: 2020-11-03

## 2020-12-01 ENCOUNTER — TELEPHONE (OUTPATIENT)
Dept: SLEEP MEDICINE | Facility: OTHER | Age: 60
End: 2020-12-01

## 2020-12-02 ENCOUNTER — HOSPITAL ENCOUNTER (OUTPATIENT)
Dept: SLEEP MEDICINE | Facility: OTHER | Age: 60
Discharge: HOME | End: 2020-12-02
Attending: INTERNAL MEDICINE
Payer: MEDICARE

## 2020-12-02 DIAGNOSIS — F51.09 OTHER INSOMNIA NOT DUE TO A SUBSTANCE OR KNOWN PHYSIOLOGICAL CONDITION: ICD-10-CM

## 2020-12-02 DIAGNOSIS — G47.10 HYPERSOMNOLENCE: ICD-10-CM

## 2020-12-02 PROCEDURE — 95800 SLP STDY UNATTENDED: CPT

## 2020-12-03 DIAGNOSIS — G47.33 OSA (OBSTRUCTIVE SLEEP APNEA): Primary | ICD-10-CM

## 2020-12-04 ENCOUNTER — TELEPHONE (OUTPATIENT)
Dept: SLEEP MEDICINE | Facility: CLINIC | Age: 60
End: 2020-12-04

## 2020-12-04 DIAGNOSIS — G47.33 OSA (OBSTRUCTIVE SLEEP APNEA): Primary | ICD-10-CM

## 2020-12-04 NOTE — TELEPHONE ENCOUNTER
Left message on the patient's voicemail (the message did not identify the recipient so I did not leave test results) to call back for results.  The second number listed did not belong to her and I removed it from demographics.   patient portal has not been set up.

## 2021-01-26 ENCOUNTER — TELEPHONE (OUTPATIENT)
Dept: SLEEP MEDICINE | Facility: CLINIC | Age: 61
End: 2021-01-26

## 2021-01-28 ENCOUNTER — TELEPHONE (OUTPATIENT)
Dept: SLEEP MEDICINE | Facility: CLINIC | Age: 61
End: 2021-01-28

## (undated) DEVICE — PAD CAST SPECIALIST STRL 4

## (undated) DEVICE — APPLICATOR CHLORAPREP ORN 26ML

## (undated) DEVICE — SYR B-D DISP CONTROL 10CC100/C

## (undated) DEVICE — ALCOHOL 70% ISOP W/GREEN 16OZ

## (undated) DEVICE — SEE MEDLINE ITEM 152529

## (undated) DEVICE — NDL HYPO REG 25G X 1 1/2

## (undated) DEVICE — BANDAGE ELASTIC 3X5 VELCRO ST

## (undated) DEVICE — GAUZE SPONGE 4X4 12PLY

## (undated) DEVICE — PACK UPPER EXTREMITY BAPTIST

## (undated) DEVICE — UNDERGLOVES BIOGEL PI SIZE 8

## (undated) DEVICE — TOURNIQUET SB QC SP 18X4IN

## (undated) DEVICE — PAD UNDERPAD 30X30

## (undated) DEVICE — SEE MEDLINE ITEM 146308

## (undated) DEVICE — GLOVE BIOGEL SKINSENSE PI 7.5

## (undated) DEVICE — DRESSING XEROFORM FOIL PK 1X8

## (undated) DEVICE — DRESSING XEROFORM 1X8IN

## (undated) DEVICE — SOL 9P NACL IRR PIC IL